# Patient Record
Sex: FEMALE | Race: BLACK OR AFRICAN AMERICAN
[De-identification: names, ages, dates, MRNs, and addresses within clinical notes are randomized per-mention and may not be internally consistent; named-entity substitution may affect disease eponyms.]

---

## 2017-11-05 ENCOUNTER — HOSPITAL ENCOUNTER (EMERGENCY)
Dept: HOSPITAL 17 - NEPE | Age: 67
Discharge: HOME | End: 2017-11-05
Payer: COMMERCIAL

## 2017-11-05 VITALS
SYSTOLIC BLOOD PRESSURE: 113 MMHG | HEART RATE: 82 BPM | DIASTOLIC BLOOD PRESSURE: 62 MMHG | RESPIRATION RATE: 18 BRPM | OXYGEN SATURATION: 96 %

## 2017-11-05 VITALS
OXYGEN SATURATION: 97 % | TEMPERATURE: 98.4 F | RESPIRATION RATE: 18 BRPM | HEART RATE: 97 BPM | SYSTOLIC BLOOD PRESSURE: 123 MMHG | DIASTOLIC BLOOD PRESSURE: 69 MMHG

## 2017-11-05 VITALS — DIASTOLIC BLOOD PRESSURE: 68 MMHG | SYSTOLIC BLOOD PRESSURE: 111 MMHG

## 2017-11-05 VITALS — DIASTOLIC BLOOD PRESSURE: 62 MMHG | SYSTOLIC BLOOD PRESSURE: 111 MMHG

## 2017-11-05 VITALS — HEIGHT: 61 IN | BODY MASS INDEX: 41.21 KG/M2 | WEIGHT: 218.26 LBS

## 2017-11-05 DIAGNOSIS — K85.90: ICD-10-CM

## 2017-11-05 DIAGNOSIS — K21.9: ICD-10-CM

## 2017-11-05 DIAGNOSIS — E11.9: ICD-10-CM

## 2017-11-05 DIAGNOSIS — R07.9: ICD-10-CM

## 2017-11-05 DIAGNOSIS — I50.9: ICD-10-CM

## 2017-11-05 DIAGNOSIS — I10: ICD-10-CM

## 2017-11-05 DIAGNOSIS — R60.9: Primary | ICD-10-CM

## 2017-11-05 DIAGNOSIS — D86.9: ICD-10-CM

## 2017-11-05 DIAGNOSIS — Z87.442: ICD-10-CM

## 2017-11-05 LAB
ANION GAP SERPL CALC-SCNC: 9 MEQ/L (ref 5–15)
BASOPHILS # BLD AUTO: 0 TH/MM3 (ref 0–0.2)
BASOPHILS NFR BLD: 0.8 % (ref 0–2)
BUN SERPL-MCNC: 43 MG/DL (ref 7–18)
CHLORIDE SERPL-SCNC: 105 MEQ/L (ref 98–107)
EOSINOPHIL # BLD: 0.1 TH/MM3 (ref 0–0.4)
EOSINOPHIL NFR BLD: 1.9 % (ref 0–4)
ERYTHROCYTE [DISTWIDTH] IN BLOOD BY AUTOMATED COUNT: 15.4 % (ref 11.6–17.2)
GFR SERPLBLD BASED ON 1.73 SQ M-ARVRAT: 30 ML/MIN (ref 89–?)
HCO3 BLD-SCNC: 27 MEQ/L (ref 21–32)
HCT VFR BLD CALC: 37 % (ref 35–46)
HEMO FLAGS: (no result)
INR PPP: 0.9 RATIO
LYMPHOCYTES # BLD AUTO: 1.9 TH/MM3 (ref 1–4.8)
LYMPHOCYTES NFR BLD AUTO: 29.7 % (ref 9–44)
MAGNESIUM SERPL-MCNC: 2.5 MG/DL (ref 1.5–2.5)
MCH RBC QN AUTO: 33.8 PG (ref 27–34)
MCHC RBC AUTO-ENTMCNC: 33.8 % (ref 32–36)
MCV RBC AUTO: 100.1 FL (ref 80–100)
MONOCYTES NFR BLD: 10.6 % (ref 0–8)
NEUTROPHILS # BLD AUTO: 3.6 TH/MM3 (ref 1.8–7.7)
NEUTROPHILS NFR BLD AUTO: 57 % (ref 16–70)
PLATELET # BLD: 216 TH/MM3 (ref 150–450)
POTASSIUM SERPL-SCNC: 4 MEQ/L (ref 3.5–5.1)
PROTHROMBIN TIME: 10 SEC (ref 9.8–11.6)
RBC # BLD AUTO: 3.7 MIL/MM3 (ref 4–5.3)
SODIUM SERPL-SCNC: 141 MEQ/L (ref 136–145)
WBC # BLD AUTO: 6.2 TH/MM3 (ref 4–11)

## 2017-11-05 PROCEDURE — 83735 ASSAY OF MAGNESIUM: CPT

## 2017-11-05 PROCEDURE — 93005 ELECTROCARDIOGRAM TRACING: CPT

## 2017-11-05 PROCEDURE — 84484 ASSAY OF TROPONIN QUANT: CPT

## 2017-11-05 PROCEDURE — 71010: CPT

## 2017-11-05 PROCEDURE — 85610 PROTHROMBIN TIME: CPT

## 2017-11-05 PROCEDURE — 99285 EMERGENCY DEPT VISIT HI MDM: CPT

## 2017-11-05 PROCEDURE — 83880 ASSAY OF NATRIURETIC PEPTIDE: CPT

## 2017-11-05 PROCEDURE — 80307 DRUG TEST PRSMV CHEM ANLYZR: CPT

## 2017-11-05 PROCEDURE — 80048 BASIC METABOLIC PNL TOTAL CA: CPT

## 2017-11-05 PROCEDURE — 85025 COMPLETE CBC W/AUTO DIFF WBC: CPT

## 2017-11-05 NOTE — RADRPT
EXAM DATE/TIME:  11/05/2017 17:40 

 

[facility] COMPARISON:     

No previous studies available for comparison.

 

                     

INDICATIONS :     

Chest Pain

                     

 

MEDICAL HISTORY :     

Congestive heart failure.          

 

SURGICAL HISTORY :     

None.   

 

ENCOUNTER:     

Initial                                        

 

ACUITY:     

1 day      

 

PAIN SCORE:     

3/10

 

LOCATION:     

Bilateral chest 

 

FINDINGS:     

A single view of the chest demonstrates the lungs to be symmetrically aerated without evidence of mas
s, infiltrate or effusion.  The cardiomediastinal contours are unremarkable.  Osseous structures are 
intact.

 

CONCLUSION:     

No acute cardiopulmonary disease demonstrated.

 

 

 

 Archie Christina MD on November 05, 2017 at 17:55           

Board Certified Radiologist.

 This report was verified electronically.

## 2017-11-05 NOTE — PD
HPI


Chief Complaint:  Edema


Time Seen by Provider:  16:23


Travel History


International Travel<30 days:  No


Contact w/Intl Traveler<30days:  No


Traveled to known affect area:  No





History of Present Illness


HPI


67-year-old female came to the emergency room with history of bilateral pedal 

edema that has started 3 days ago.  Patient wears an and clipped and had to 

take that off since the ankle was getting swollen and the jewelry was getting 

tight.  Patient has history of heart disease and on torsemide.  She says she 

has been compliant with her medications.  No history of chest pain.  She has 

some shortness of breath.  Vital signs were otherwise stable.  No history of 

long distance travel.  No history of previous DVT or PE.





PFSH


Past Medical History


*** Narrative Medical


List of her past medical, surgical, social and family history is reviewed from 

the nursing note.


Arthritis:  Yes (HIP, HANDS & SPINE, KNEES )


Asthma:  No


Autoimmune Disease:  Yes (SARCODOSIS)


Anxiety:  No


Depression:  Yes


Heart Rhythm Problems:  Yes


Cancer:  Yes (CERVICAL)


Cardiovascular Problems:  Yes


High Cholesterol:  No


Chemotherapy:  No


Chest Pain:  No


Congestive Heart Failure:  Yes


COPD:  No


Cerebrovascular Accident:  No


Diabetes:  Yes


Diminished Hearing:  No


Endocrine:  Yes


Gastrointestinal Disorders:  Yes (PANCREATITIS)


GERD:  Yes


Genitourinary:  Yes


Headaches:  Yes


Hepatitis:  Yes


Hypertension:  Yes


Immune Disorder:  No


Implanted Vascular Access Dvce:  No


Kidney Stones:  Yes


Musculoskeletal:  Yes


Neurologic:  No


Psychiatric:  Yes


Reproductive:  Yes (HYSTERERCTOMY 1973)


Respiratory:  Yes


Immunizations Current:  Yes


Migraines:  Yes


Radiation Therapy:  No


Renal Failure:  No


Sickle Cell Disease:  No


Sleep Apnea:  No


Thyroid Disease:  No


Ulcer:  Yes


Menopausal:  Yes


Tubal Ligation:  Yes





Past Surgical History


Abdominal Surgery:  Yes (APPY; CHOLECY )


AICD:  No


Appendectomy:  Yes (AGE 17)


Arteriovenous Shunt:  No


Cardiac Surgery:  No


Cholecystectomy:  Yes (1989)


Ear Surgery:  No


Eye Surgery:  No


Genitourinary Surgery:  No


Gynecologic Surgery:  Yes (HYSTERECTOMY (PARTIAL) )


Hysterectomy:  Yes


Insulin Pump:  No


Joint Replacement:  Yes (bilateral hips)


Oral Surgery:  No


Pacemaker:  No


Thoracic Surgery:  No


Tonsillectomy:  Yes (AGE 21)


Other Surgery:  Yes (BILATERAL BENIGN CYSTS REMOVED BREASTS)





Social History


Alcohol Use:  No (NONE SINCE 2004)


Tobacco Use:  Yes (1/2 PPD/STARTED AGE 23)


Substance Use:  No





Allergies-Medications


(Allergen,Severity, Reaction):  


Coded Allergies:  


     Sulfa (Sulfonamide Antibiotics) (Unverified  Allergy, Severe, SWELLING, 11/ 5/17)


 CAN'T BREATHE


     aspirin (Unverified  Allergy, Severe, "FAST HEART BEAT", 11/5/17)


 RASH, KIDNEY PROBLEMS


     codeine (Unverified  Allergy, Severe, SWELLING, 11/5/17)


 CAN'T BREATHE


     penicillin G (Unverified  Allergy, Severe, SWELLING, 11/5/17)


 CAN'T BREATHE


Comments


List of her allergies reviewed from the nursing note.


Reported Meds & Prescriptions





Reported Meds & Active Scripts


Active


Proventil Ud 0.083% (2.5 Mg/3 Ml) (Albuterol Sulfate) 2.5 Mg/3 Ml Inha 2.5 Mg 

INH Q4 PRN


Ventolin Hfa (Albuterol Sulfate) 8 Gm Aero 2 Puff INH Q6 PRN


     * SHAKE WELL BEFORE USE *


Deltasone 20 Mg Tab (Prednisone) 20 Mg Tab 40 Mg PO DAILY


Zithromax Z-Timur (Azithromycin) 250 Mg Tab 250 Mg PO AS DIRECTED 5 Days


     500 MG (2 TABLETS) PO ON DAY 1, THEN 250 MG (1 TABLET) PO ON DAYS 2 TO


     5.


Reported


Vitamin D (Cholecalciferol) 2,000 Unit Cap 1 Cap PO DAILY


Hm Omeprazole (Omeprazole) 20 Mg Tab 20 Mg PO DAILY


Zetia (Ezetimibe) 10 Mg Tab 10 Mg PO DAILY


Bethanechol Chloride 25 Mg Tab 25 Mg PO TID


Atenolol 50 Mg Tab 50 Mg PO DAILY


Allopurinol 300 Mg Tab 150 Mg PO DAILY


Nifediac cc (Nifedipine) 90 Mg Tab 90 Mg PO DAILY


Glipizide 5 Mg Tab 5 Mg PO BIDAC


Torsemide 20 Mg Tab 20 Mg PO BID


Folate (Folic Acid) 1 Mg Tab 1 Mg PO DAILY





Narrative Medication


List of her home medications reviewed from the nursing note.





Review of Systems


Except as stated in HPI:  all other systems reviewed are Neg


Respiratory:  Positive: Shortness of Breath





Physical Exam


Narrative


GENERAL: Awake, alert, obese, no obvious distress


SKIN: Focused skin assessment warm/dry.


HEAD: Atraumatic. Normocephalic. 


EYES: Pupils equal and round. No scleral icterus. No injection or drainage. 


ENT: No nasal bleeding or discharge.  Mucous membranes pink and moist.


NECK: Trachea midline. No JVD. 


CARDIOVASCULAR: Regular rate and rhythm.  No murmur appreciated.


RESPIRATORY: No accessory muscle use. Clear to auscultation. Breath sounds 

equal bilaterally. 


GASTROINTESTINAL: Abdomen soft, non-tender, nondistended. Hepatic and splenic 

margins not palpable. 


MUSCULOSKELETAL: No obvious deformities. No clubbing.  No cyanosis.  Bilateral 

pedal edema


NEUROLOGICAL: Awake and alert. No obvious cranial nerve deficits.  Motor 

grossly within normal limits. Normal speech.


PSYCHIATRIC: Appropriate mood and affect; insight and judgment normal.





Data


Data


Last Documented VS





Vital Signs








  Date Time  Temp Pulse Resp B/P (MAP) Pulse Ox O2 Delivery O2 Flow Rate FiO2


 


11/5/17 18:58    111/68 (82)    


 


11/5/17 17:39  82 18  96   


 


11/5/17 17:39      Room Air  


 


11/5/17 16:16 98.4       








Orders





 Orders


Electrocardiogram (11/5/17 16:35)


Basic Metabolic Panel (Bmp) (11/5/17 16:35)


B-Type Natriuretic Peptide (11/5/17 16:35)


Complete Blood Count With Diff (11/5/17 16:35)


Magnesium (Mg) (11/5/17 16:35)


Prothrombin Time / Inr (Pt) (11/5/17 16:35)


Troponin I (11/5/17 16:35)


Chest, Single Ap (11/5/17 16:35)


Ecg Monitoring (11/5/17 16:35)


Bilateral Bp Monitoring (11/5/17 16:35)


Iv Access Insert/Monitor (11/5/17 16:35)


Oximetry (11/5/17 16:35)


Oxygen Administration (11/5/17 16:35)


Sodium Chloride 0.9% Flush (Ns Flush) (11/5/17 16:45)


Drug Screen, Random Urine (11/5/17 16:35)


Vascular Access Team Consult/P PRN (11/5/17 16:51)


Vascular Poc Ultrasound (11/5/17 )


Ed Discharge Order (11/5/17 18:45)





Labs





Laboratory Tests








Test


  11/5/17


17:00 11/5/17


17:15


 


Urine Opiates Screen POS  


 


Urine Barbiturates Screen NEG  


 


Urine Amphetamines Screen NEG  


 


Urine Benzodiazepines Screen NEG  


 


Urine Cocaine Screen NEG  


 


Urine Cannabinoids Screen NEG  


 


White Blood Count  6.2 TH/MM3 


 


Red Blood Count  3.70 MIL/MM3 


 


Hemoglobin  12.5 GM/DL 


 


Hematocrit  37.0 % 


 


Mean Corpuscular Volume  100.1 FL 


 


Mean Corpuscular Hemoglobin  33.8 PG 


 


Mean Corpuscular Hemoglobin


Concent 


  33.8 % 


 


 


Red Cell Distribution Width  15.4 % 


 


Platelet Count  216 TH/MM3 


 


Mean Platelet Volume  9.3 FL 


 


Neutrophils (%) (Auto)  57.0 % 


 


Lymphocytes (%) (Auto)  29.7 % 


 


Monocytes (%) (Auto)  10.6 % 


 


Eosinophils (%) (Auto)  1.9 % 


 


Basophils (%) (Auto)  0.8 % 


 


Neutrophils # (Auto)  3.6 TH/MM3 


 


Lymphocytes # (Auto)  1.9 TH/MM3 


 


Monocytes # (Auto)  0.7 TH/MM3 


 


Eosinophils # (Auto)  0.1 TH/MM3 


 


Basophils # (Auto)  0.0 TH/MM3 


 


CBC Comment  DIFF FINAL 


 


Differential Comment   


 


Prothrombin Time  10.0 SEC 


 


Prothromb Time International


Ratio 


  0.9 RATIO 


 


 


Blood Urea Nitrogen  43 MG/DL 


 


Creatinine  2.00 MG/DL 


 


Random Glucose  87 MG/DL 


 


Calcium Level  8.7 MG/DL 


 


Magnesium Level  2.5 MG/DL 


 


Sodium Level  141 MEQ/L 


 


Potassium Level  4.0 MEQ/L 


 


Chloride Level  105 MEQ/L 


 


Carbon Dioxide Level  27.0 MEQ/L 


 


Anion Gap  9 MEQ/L 


 


Estimat Glomerular Filtration


Rate 


  30 ML/MIN 


 


 


Troponin I


  


  LESS THAN 0.02


NG/ML


 


B-Type Natriuretic Peptide  104 PG/ML 











MDM


Medical Decision Making


Medical Screen Exam Complete:  Yes


Emergency Medical Condition:  Yes


Medical Record Reviewed:  Yes


Interpretation(s)


Twelve-lead EKG was reviewed by me.  Normal sinus rhythm, left axis deviation, 

anterior lateral T wave inversions.  Heart rate of 92 bpm.


Differential Diagnosis


Fluid Overload, dependent edema


Narrative Course


5:18 PM awaiting for the blood test results.





6:46 PM blood test results are back.  Renal function has worsened from the 

past.  But patient is also on torsemide.  I'm comfortable discharging her home 

at this point.  She needs to follow up with her primary care nephrologist.





Procedures


EKG Prior to Arrival:  No





Diagnosis





 Primary Impression:  


 Edema


 Qualified Codes:  R60.9 - Edema, unspecified


 Additional Impression:  


 Renal insufficiency


Referrals:  


Primary Care Physician





***Additional Instructions:  


Please ask your primary care to refer you to a kidney doctor/nephrologist.  You 

should be on complete salt restricted diet.  Return to the ER if the condition 

worsens or any other new concerns.  Otherwise follow-up with your primary care 

in 1-2 days.


Disposition:  01 DISCHARGE HOME


Condition:  Stable











Jose Gonzalez MD Nov 5, 2017 16:24

## 2017-11-05 NOTE — EKG
Date Performed: 11/05/2017       Time Performed: 17:03:05

 

PTAGE:      67 years

 

EKG:      Sinus rhythm 

 

 NONSPECIFIC T-WAVE ABNORMALITY BORDERLINE ECG

 

PREVIOUS TRACING       : 10/22/2014 22.42 No significant change from previous tracing noted.

 

DOCTOR:   Nabor Ferro  Interpretating Date/Time  11/05/2017 21:09:04

## 2018-02-09 ENCOUNTER — HOSPITAL ENCOUNTER (EMERGENCY)
Dept: HOSPITAL 17 - NEPE | Age: 68
Discharge: HOME | End: 2018-02-09
Payer: COMMERCIAL

## 2018-02-09 VITALS
SYSTOLIC BLOOD PRESSURE: 152 MMHG | HEART RATE: 94 BPM | TEMPERATURE: 97.8 F | RESPIRATION RATE: 20 BRPM | DIASTOLIC BLOOD PRESSURE: 73 MMHG | OXYGEN SATURATION: 95 %

## 2018-02-09 VITALS
SYSTOLIC BLOOD PRESSURE: 131 MMHG | HEART RATE: 79 BPM | DIASTOLIC BLOOD PRESSURE: 70 MMHG | RESPIRATION RATE: 17 BRPM | OXYGEN SATURATION: 97 %

## 2018-02-09 VITALS — BODY MASS INDEX: 37.88 KG/M2 | HEIGHT: 61 IN | WEIGHT: 200.62 LBS

## 2018-02-09 DIAGNOSIS — R94.31: ICD-10-CM

## 2018-02-09 DIAGNOSIS — F17.200: ICD-10-CM

## 2018-02-09 DIAGNOSIS — I13.0: ICD-10-CM

## 2018-02-09 DIAGNOSIS — I50.9: ICD-10-CM

## 2018-02-09 DIAGNOSIS — N18.9: ICD-10-CM

## 2018-02-09 DIAGNOSIS — E11.22: ICD-10-CM

## 2018-02-09 DIAGNOSIS — K21.9: ICD-10-CM

## 2018-02-09 DIAGNOSIS — F32.9: ICD-10-CM

## 2018-02-09 DIAGNOSIS — E87.6: Primary | ICD-10-CM

## 2018-02-09 LAB
BASOPHILS # BLD AUTO: 0 TH/MM3 (ref 0–0.2)
BASOPHILS NFR BLD: 0.7 % (ref 0–2)
BUN SERPL-MCNC: 36 MG/DL (ref 7–18)
CALCIUM SERPL-MCNC: 8.8 MG/DL (ref 8.5–10.1)
CHLORIDE SERPL-SCNC: 106 MEQ/L (ref 98–107)
CREAT SERPL-MCNC: 1.46 MG/DL (ref 0.5–1)
EOSINOPHIL # BLD: 0.1 TH/MM3 (ref 0–0.4)
EOSINOPHIL NFR BLD: 1.9 % (ref 0–4)
ERYTHROCYTE [DISTWIDTH] IN BLOOD BY AUTOMATED COUNT: 15.5 % (ref 11.6–17.2)
GFR SERPLBLD BASED ON 1.73 SQ M-ARVRAT: 43 ML/MIN (ref 89–?)
GLUCOSE SERPL-MCNC: 83 MG/DL (ref 74–106)
HCO3 BLD-SCNC: 26 MEQ/L (ref 21–32)
HCT VFR BLD CALC: 39.2 % (ref 35–46)
HGB BLD-MCNC: 13.5 GM/DL (ref 11.6–15.3)
LYMPHOCYTES # BLD AUTO: 1.9 TH/MM3 (ref 1–4.8)
LYMPHOCYTES NFR BLD AUTO: 28.3 % (ref 9–44)
MAGNESIUM SERPL-MCNC: 2 MG/DL (ref 1.5–2.5)
MCH RBC QN AUTO: 34.1 PG (ref 27–34)
MCHC RBC AUTO-ENTMCNC: 34.5 % (ref 32–36)
MCV RBC AUTO: 98.9 FL (ref 80–100)
MONOCYTE #: 0.6 TH/MM3 (ref 0–0.9)
MONOCYTES NFR BLD: 8.3 % (ref 0–8)
NEUTROPHILS # BLD AUTO: 4.2 TH/MM3 (ref 1.8–7.7)
NEUTROPHILS NFR BLD AUTO: 60.8 % (ref 16–70)
PLATELET # BLD: 220 TH/MM3 (ref 150–450)
PMV BLD AUTO: 8.9 FL (ref 7–11)
RBC # BLD AUTO: 3.97 MIL/MM3 (ref 4–5.3)
SODIUM SERPL-SCNC: 139 MEQ/L (ref 136–145)
WBC # BLD AUTO: 6.9 TH/MM3 (ref 4–11)

## 2018-02-09 PROCEDURE — 80048 BASIC METABOLIC PNL TOTAL CA: CPT

## 2018-02-09 PROCEDURE — 85025 COMPLETE CBC W/AUTO DIFF WBC: CPT

## 2018-02-09 PROCEDURE — 99284 EMERGENCY DEPT VISIT MOD MDM: CPT

## 2018-02-09 PROCEDURE — 83735 ASSAY OF MAGNESIUM: CPT

## 2018-02-09 PROCEDURE — 93005 ELECTROCARDIOGRAM TRACING: CPT

## 2018-02-09 NOTE — PD
HPI


Chief Complaint:  Abnormal Results


Time Seen by Provider:  11:11


Travel History


International Travel<30 days:  No


Contact w/Intl Traveler<30days:  No


Traveled to known affect area:  No





History of Present Illness


HPI


67-year-old female patient with history of diabetes, hypertension, chronic 

kidney disease being followed by Dr. Jasso, had routine blood work done a few 

days ago and they had called her today stating that her potassium was elevated 

and that she needs to be evaluated in the ER.  Patient states that she has been 

having several days of feeling sluggish, headaches, and nausea.  She denies any 

vomiting, fevers, chest pains, shortness of breath, or other symptoms.





Modifying Factors: None


Associated Signs & Symptoms: Abnormal potassium on lab work


Risk Factors: Chronic kidney disease





PFSH


Past Medical History


Arthritis:  Yes (HIP, HANDS & SPINE, KNEES )


Asthma:  No


Autoimmune Disease:  Yes (SARCODOSIS)


Anxiety:  No


Depression:  Yes


Heart Rhythm Problems:  Yes


Cancer:  Yes (CERVICAL)


Cardiovascular Problems:  Yes


High Cholesterol:  No


Chemotherapy:  No


Chest Pain:  No


Congestive Heart Failure:  Yes


COPD:  No


Cerebrovascular Accident:  No


Diabetes:  Yes


Patient Takes Glucophage:  No


Diminished Hearing:  No


Endocrine:  Yes


Gastrointestinal Disorders:  Yes (PANCREATITIS)


GERD:  Yes


Genitourinary:  Yes


Headaches:  Yes


Hepatitis:  Yes


Hypertension:  Yes


Immune Disorder:  No


Implanted Vascular Access Dvce:  No


Kidney Stones:  Yes


Musculoskeletal:  Yes


Neurologic:  No


Psychiatric:  Yes


Reproductive:  Yes (HYSTERERCTOMY 1973)


Respiratory:  Yes


Immunizations Current:  Yes


Migraines:  Yes


Radiation Therapy:  No


Renal Failure:  No


Sickle Cell Disease:  No


Sleep Apnea:  No


Thyroid Disease:  No


Ulcer:  Yes


Tetanus Vaccination:  > 5 Years


Pregnant?:  Not Pregnant


Menopausal:  Yes


Tubal Ligation:  Yes





Past Surgical History


Abdominal Surgery:  Yes (APPY; CHOLECY )


AICD:  No


Appendectomy:  Yes (AGE 17)


Arteriovenous Shunt:  No


Cardiac Surgery:  No


Cholecystectomy:  Yes (1989)


Ear Surgery:  No


Eye Surgery:  No


Genitourinary Surgery:  No


Gynecologic Surgery:  Yes (HYSTERECTOMY (PARTIAL) )


Hysterectomy:  Yes


Insulin Pump:  No


Joint Replacement:  Yes (bilateral hips)


Oral Surgery:  No


Pacemaker:  No


Thoracic Surgery:  No


Tonsillectomy:  Yes (AGE 21)


Other Surgery:  Yes (BILATERAL BENIGN CYSTS REMOVED BREASTS)





Social History


Alcohol Use:  No (NONE SINCE 2004)


Tobacco Use:  Yes (1/2 PPD/STARTED AGE 23)


Substance Use:  No





Allergies-Medications


(Allergen,Severity, Reaction):  


Coded Allergies:  


     Sulfa (Sulfonamide Antibiotics) (Unverified  Allergy, Severe, SWELLING, 2/9 /18)


 CAN'T BREATHE


     aspirin (Unverified  Allergy, Severe, "FAST HEART BEAT", 2/9/18)


 RASH, KIDNEY PROBLEMS


     codeine (Unverified  Allergy, Severe, SWELLING, 2/9/18)


 CAN'T BREATHE


     penicillin G (Unverified  Allergy, Severe, SWELLING, 2/9/18)


 CAN'T BREATHE


Reported Meds & Prescriptions





Reported Meds & Active Scripts


Active


Reported


Nifedipine ER 24 HR (Nifedipine) 90 Mg Tab 90 Mg PO DAILY


Losartan (Losartan Potassium) 25 Mg Tab 25 Mg PO DAILY


Sodium Bicarbonate 650 Mg Tab 650 Mg PO BIDPC


Biotin 5 Mg Cap 5 Mg PO 


Bumetanide 2 Mg Tab 2 Mg PO BID


Bethanechol 25 Mg Tab 25 Mg PO Q8HR


Atenolol 50 Mg Tab 50 Mg PO DAILY


Potassium Chloride ER (Potassium Chloride) 10 Meq Cap 10 Meq PO BID


Allopurinol 300 Mg Tab 300 Mg PO DAILY


Duloxetine DR (Duloxetine HCl) 30 Mg Capdr 30 Mg PO DAILY


Omeprazole 20 Mg Tab 20 Mg PO DAILY


Folic Acid 0.8 Mg Tab 1,000 Mcg PO DAILY


Glipizide 5 Mg Tab 5 Mg PO BIDAC


     Take 30 minutes before a meal


Gabapentin 300 Mg Cap 300 Mg PO TID


Vitamin D-1000 (Cholecalciferol) 1,000 Unit Tab 2,000 Units PO DAILY








Review of Systems


Except as stated in HPI:  all other systems reviewed are Neg





Physical Exam


Narrative


GENERAL: Well-developed elderly -American female patient currently in no 

acute distress.  Awake and oriented 3.


SKIN: Focused skin assessment warm/dry.


HEAD: Atraumatic. Normocephalic. 


EYES: Pupils equal and round. No scleral icterus. No injection or drainage. 


ENT: No nasal bleeding or discharge.  Mucous membranes pink and moist.


NECK: Trachea midline. No JVD. 


CARDIOVASCULAR: Regular rate and rhythm.  No murmur appreciated.


RESPIRATORY: No accessory muscle use. Clear to auscultation. Breath sounds 

equal bilaterally. 


GASTROINTESTINAL: Abdomen soft, non-tender, nondistended. Hepatic and splenic 

margins not palpable. 


MUSCULOSKELETAL: No obvious deformities. No clubbing.  No cyanosis.  No edema. 


NEUROLOGICAL: Awake and alert. No obvious cranial nerve deficits.  Motor 

grossly within normal limits. Normal speech.


PSYCHIATRIC: Appropriate mood and affect; insight and judgment normal.





Data


Data


Last Documented VS





Vital Signs








  Date Time  Temp Pulse Resp B/P (MAP) Pulse Ox O2 Delivery O2 Flow Rate FiO2


 


2/9/18 10:17 97.8 94 20 152/73 (99) 95   








Orders





 Orders


Electrocardiogram (2/9/18 10:48)


Complete Blood Count With Diff (2/9/18 10:48)


Basic Metabolic Panel (Bmp) (2/9/18 10:48)


Magnesium (Mg) (2/9/18 10:48)


Ed Discharge Order (2/9/18 12:39)





Labs





Laboratory Tests








Test


  2/9/18


11:30


 


White Blood Count 6.9 TH/MM3 


 


Red Blood Count 3.97 MIL/MM3 


 


Hemoglobin 13.5 GM/DL 


 


Hematocrit 39.2 % 


 


Mean Corpuscular Volume 98.9 FL 


 


Mean Corpuscular Hemoglobin 34.1 PG 


 


Mean Corpuscular Hemoglobin


Concent 34.5 % 


 


 


Red Cell Distribution Width 15.5 % 


 


Platelet Count 220 TH/MM3 


 


Mean Platelet Volume 8.9 FL 


 


Neutrophils (%) (Auto) 60.8 % 


 


Lymphocytes (%) (Auto) 28.3 % 


 


Monocytes (%) (Auto) 8.3 % 


 


Eosinophils (%) (Auto) 1.9 % 


 


Basophils (%) (Auto) 0.7 % 


 


Neutrophils # (Auto) 4.2 TH/MM3 


 


Lymphocytes # (Auto) 1.9 TH/MM3 


 


Monocytes # (Auto) 0.6 TH/MM3 


 


Eosinophils # (Auto) 0.1 TH/MM3 


 


Basophils # (Auto) 0.0 TH/MM3 


 


CBC Comment DIFF FINAL 


 


Differential Comment  


 


Blood Urea Nitrogen 36 MG/DL 


 


Creatinine 1.46 MG/DL 


 


Random Glucose 83 MG/DL 


 


Calcium Level 8.8 MG/DL 


 


Magnesium Level 2.0 MG/DL 


 


Sodium Level 139 MEQ/L 


 


Potassium Level 4.5 MEQ/L 


 


Chloride Level 106 MEQ/L 


 


Carbon Dioxide Level 26.0 MEQ/L 


 


Anion Gap 7 MEQ/L 


 


Estimat Glomerular Filtration


Rate 43 ML/MIN 


 











MDM


Medical Decision Making


Medical Screen Exam Complete:  Yes


Emergency Medical Condition:  Yes


Medical Record Reviewed:  Yes


Interpretation(s)


EKG shows NSR, no ST elevation or depression, and no arrhythmias.  No  

significant T-wave inversions.








Laboratory Tests








Test


  2/9/18


11:30


 


Red Blood Count


  3.97 MIL/MM3


(4.00-5.30)


 


Mean Corpuscular Hemoglobin


  34.1 PG


(27.0-34.0)


 


Monocytes (%) (Auto)


  8.3 %


(0.0-8.0)


 


Blood Urea Nitrogen


  36 MG/DL


(7-18)


 


Creatinine


  1.46 MG/DL


(0.50-1.00)


 


Estimat Glomerular Filtration


Rate 43 ML/MIN


(>89)








Differential Diagnosis


Abnormal potassium: Hemolyzed cyst versus chronic kidney disease versus 

metabolic issues


Narrative Course


Lab work shows a normal potassium in the ER.  At this point, vital signs are 

stable.  There are no signs of dysrhythmias.  My plan would be to release her 

with follow-up to renal doctor.  Return for any new issues as needed.  The plan 

has been discussed with her and she states understanding.





Diagnosis





 Primary Impression:  


 Abnormal laboratory test


Disposition:  01 DISCHARGE HOME


Condition:  Stable











Anya Newell MD Feb 9, 2018 11:32

## 2018-02-11 NOTE — EKG
Date Performed: 02/09/2018       Time Performed: 11:08:54

 

PTAGE:      67 years

 

EKG:      Sinus rhythm 

 

 MODERATE VOLTAGE CRITERIA FOR LVH, CONSIDER NORMAL VARIANT NONSPECIFIC T-WAVE ABNORMALITY BORDERLINE
 ECG

 

PREVIOUS TRACING       : 11/05/2017 17.03 Compared to prior tracing,  now with criteria for LVH

 

DOCTOR:   Octavio Manriquez  Interpretating Date/Time  02/11/2018 00:31:59

## 2018-03-04 ENCOUNTER — HOSPITAL ENCOUNTER (EMERGENCY)
Dept: HOSPITAL 17 - NEPD | Age: 68
Discharge: HOME | End: 2018-03-04
Payer: COMMERCIAL

## 2018-03-04 VITALS — BODY MASS INDEX: 40.79 KG/M2 | WEIGHT: 216.05 LBS | HEIGHT: 61 IN

## 2018-03-04 VITALS
OXYGEN SATURATION: 97 % | RESPIRATION RATE: 18 BRPM | DIASTOLIC BLOOD PRESSURE: 67 MMHG | SYSTOLIC BLOOD PRESSURE: 126 MMHG | TEMPERATURE: 98.6 F | HEART RATE: 86 BPM

## 2018-03-04 VITALS — SYSTOLIC BLOOD PRESSURE: 130 MMHG | DIASTOLIC BLOOD PRESSURE: 72 MMHG

## 2018-03-04 DIAGNOSIS — I11.0: ICD-10-CM

## 2018-03-04 DIAGNOSIS — F17.200: ICD-10-CM
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
## 2018-03-04 DIAGNOSIS — S76.011A: Primary | ICD-10-CM

## 2018-03-04 DIAGNOSIS — I50.9: ICD-10-CM

## 2018-03-04 PROCEDURE — 99283 EMERGENCY DEPT VISIT LOW MDM: CPT

## 2018-03-04 PROCEDURE — 72110 X-RAY EXAM L-2 SPINE 4/>VWS: CPT

## 2018-03-04 PROCEDURE — 73502 X-RAY EXAM HIP UNI 2-3 VIEWS: CPT

## 2018-03-04 PROCEDURE — 73552 X-RAY EXAM OF FEMUR 2/>: CPT

## 2018-03-04 NOTE — PD
HPI


Chief Complaint:  Hip Injury


Time Seen by Provider:  13:58


Travel History


International Travel<30 days:  No


Contact w/Intl Traveler<30days:  No


Traveled to known affect area:  No





History of Present Illness


HPI


68-year-old female came to the emergency room with history of a fall 2 days 

ago.  Patient says since then her right hip has been hurting.  She is able to 

walk but it hurts and the pain is progressively getting worse.  She has pain 

pills at home and she has taken them but does not seem to help.  Finally the 

patient decided to come to the emergency room.  She has had hip replacement 

surgery in the past.  She's never had issues with this happened in the past.  

Patient says the fall was due to tripping on her dog.  Vital signs are stable.





Critical access hospital


Past Medical History


*** Narrative Medical


List of her past medical, surgical, social and family history is reviewed from 

the nursing note.


Arthritis:  Yes (HIP, HANDS & SPINE, KNEES )


Asthma:  No


Autoimmune Disease:  Yes (SARCODOSIS)


Anxiety:  No


Depression:  Yes


Heart Rhythm Problems:  Yes


Cancer:  Yes (CERVICAL)


Cardiovascular Problems:  Yes


High Cholesterol:  No


Chemotherapy:  No


Chest Pain:  No


Congestive Heart Failure:  Yes


COPD:  No


Cerebrovascular Accident:  No


Diabetes:  Yes (TYPE 2)


Diminished Hearing:  No


Endocrine:  Yes


Gastrointestinal Disorders:  Yes (PANCREATITIS)


GERD:  Yes


Genitourinary:  Yes


Headaches:  Yes


Hepatitis:  Yes


Hypertension:  Yes


Immune Disorder:  No


Implanted Vascular Access Dvce:  No


Kidney Stones:  Yes


Musculoskeletal:  Yes


Neurologic:  No


Psychiatric:  Yes


Reproductive:  Yes (HYSTERERCTOMY 1973)


Respiratory:  Yes


Immunizations Current:  Yes


Migraines:  Yes


Radiation Therapy:  No


Renal Failure:  No


Sickle Cell Disease:  No


Sleep Apnea:  No


Thyroid Disease:  No


Ulcer:  Yes


Pregnant?:  Not Pregnant


Menopausal:  Yes


Tubal Ligation:  Yes





Past Surgical History


Abdominal Surgery:  Yes (APPY; CHOLECY )


AICD:  No


Appendectomy:  Yes (AGE 17)


Arteriovenous Shunt:  No


Cardiac Surgery:  No


Cholecystectomy:  Yes (1989)


Ear Surgery:  No


Eye Surgery:  No


Genitourinary Surgery:  No


Gynecologic Surgery:  Yes (HYSTERECTOMY (PARTIAL) )


Hysterectomy:  Yes


Insulin Pump:  No


Joint Replacement:  Yes (bilateral hips)


Oral Surgery:  No


Pacemaker:  No


Thoracic Surgery:  No


Tonsillectomy:  Yes (AGE 21)


Other Surgery:  Yes (BILATERAL BENIGN CYSTS REMOVED BREASTS)





Social History


Alcohol Use:  No (NONE SINCE 2004)


Tobacco Use:  Yes (1/2 PPD/STARTED AGE 23)


Substance Use:  No





Allergies-Medications


(Allergen,Severity, Reaction):  


Coded Allergies:  


     Sulfa (Sulfonamide Antibiotics) (Unverified  Allergy, Severe, SWELLING, 3/4

/18)


 CAN'T BREATHE


     aspirin (Unverified  Allergy, Severe, "FAST HEART BEAT", 3/4/18)


 RASH, KIDNEY PROBLEMS


     codeine (Unverified  Allergy, Severe, SWELLING, 3/4/18)


 CAN'T BREATHE


     penicillin G (Unverified  Allergy, Severe, SWELLING, 3/4/18)


 CAN'T BREATHE


Comments


List of her allergies reviewed from the nursing note.


Reported Meds & Prescriptions





Reported Meds & Active Scripts


Active


Flexeril (Cyclobenzaprine HCl) 5 Mg Tab 5 Mg PO TID


Reported


Nifedipine ER 24 HR (Nifedipine) 90 Mg Tab 90 Mg PO DAILY


Losartan (Losartan Potassium) 25 Mg Tab 25 Mg PO DAILY


Sodium Bicarbonate 650 Mg Tab 650 Mg PO BIDPC


Biotin 5 Mg Cap 5 Mg PO 


Bumetanide 2 Mg Tab 2 Mg PO BID


Bethanechol 25 Mg Tab 25 Mg PO Q8HR


Atenolol 50 Mg Tab 50 Mg PO DAILY


Potassium Chloride ER (Potassium Chloride) 10 Meq Cap 10 Meq PO BID


Allopurinol 300 Mg Tab 300 Mg PO DAILY


Duloxetine DR (Duloxetine HCl) 30 Mg Capdr 30 Mg PO DAILY


Omeprazole 20 Mg Tab 20 Mg PO DAILY


Folic Acid 0.8 Mg Tab 1,000 Mcg PO DAILY


Glipizide 5 Mg Tab 5 Mg PO BIDAC


     Take 30 minutes before a meal


Gabapentin 300 Mg Cap 300 Mg PO TID


Vitamin D-1000 (Cholecalciferol) 1,000 Unit Tab 2,000 Units PO DAILY





Narrative Medication


List of her home medications reviewed from the nursing note.





Review of Systems


Except as stated in HPI:  all other systems reviewed are Neg


Musculoskeletal:  Positive: Pain





Physical Exam


Narrative


GENERAL: Awake, alert, moderate distress, obese


SKIN: Focused skin assessment warm/dry.


HEAD: Atraumatic. Normocephalic. 


EYES: Pupils equal and round. No scleral icterus. No injection or drainage. 


ENT: No nasal bleeding or discharge.  Mucous membranes pink and moist.


NECK: Trachea midline. No JVD. 


CARDIOVASCULAR: Regular rate and rhythm.  No murmur appreciated.


RESPIRATORY: No accessory muscle use. Clear to auscultation. Breath sounds 

equal bilaterally. 


GASTROINTESTINAL: Abdomen soft, non-tender, nondistended. Hepatic and splenic 

margins not palpable. 


MUSCULOSKELETAL: No obvious deformities. No clubbing.  No cyanosis.  No edema.  

Slight antalgic gait on the right side


NEUROLOGICAL: Awake and alert. No obvious cranial nerve deficits.  Motor 

grossly within normal limits. Normal speech.


PSYCHIATRIC: Appropriate mood and affect; insight and judgment normal.





Data


Data


Last Documented VS





Vital Signs








  Date Time  Temp Pulse Resp B/P (MAP) Pulse Ox O2 Delivery O2 Flow Rate FiO2


 


3/4/18 15:29  78 18 130/72 (91) 98   


 


3/4/18 13:40 98.6       








Orders





 Orders


Hip, Uni(Ap&Lat) W Ap Pelvis (3/4/18 )


Femur (Ap & Lat/2vws) (3/4/18 )


Spine, Lumbar Comp W/Obliq (3/4/18 )


Ed Discharge Order (3/4/18 15:21)








MDM


Medical Decision Making


Medical Screen Exam Complete:  Yes


Emergency Medical Condition:  Yes


Medical Record Reviewed:  Yes


Differential Diagnosis


Hip fracture, hip strain, pelvic fracture


Narrative Course


2:47 PM awaiting for the x-rays to be done and resulted.  Patient has already 

taken pain medication at home before coming in and hence I did not give her 

anything for pain here.





3:21 PM all the x-rays came back reported no acute injuries.  I will discharge 

her home.  She'll go home with prescription for muscle relaxants.





Procedures


EKG Prior to Arrival:  No





Diagnosis





 Primary Impression:  


 Fall


 Qualified Codes:  W19.XXXA - Unspecified fall, initial encounter


 Additional Impression:  


 Hip strain


 Qualified Codes:  S76.011A - Strain of muscle, fascia and tendon of right hip, 

initial encounter


Referrals:  


Primary Care Physician





***Additional Instructions:  


Take the medication as per the prescription direction.  Follow-up with your 

primary care.


***Med/Other Pt SpecificInfo:  Prescription(s) given


Scripts


Cyclobenzaprine (Flexeril) 5 Mg Tab


5 MG PO TID for Muscle Spasm, #15 TAB 0 Refills


   Prov: Jose Gonzalez MD         3/4/18


Disposition:  01 DISCHARGE HOME


Condition:  Stable











Jose Gonzalez MD Mar 4, 2018 13:59

## 2018-03-04 NOTE — RADRPT
EXAM DATE/TIME:  03/04/2018 14:18 

 

[facility] COMPARISON:     

No previous studies available for comparison.

 

                     

INDICATIONS :     

Patient fell 3 days ago now pain in lower back.

                     

 

MEDICAL HISTORY :     

Congestive heart failure.          

 

SURGICAL HISTORY :     

None.   

 

ENCOUNTER:     

Initial                                        

 

ACUITY:     

3 days      

 

PAIN SCORE:     

8/10

 

LOCATION:     

Bilateral  lower back

 

FINDINGS:     

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 There no compression deformities. Mild disc space multi
level osteophyte formation present. Moderate facet hypertrophic changes are noted. Mild anterior oste
ophyte formation.

 

CONCLUSION:     

Degenerative changes are noted. No obvious fractures.

 

 

 

 Williams Valles MD on March 04, 2018 at 14:39           

Board Certified Radiologist.

 This report was verified electronically.

## 2018-03-04 NOTE — RADRPT
EXAM DATE/TIME:  03/04/2018 14:19 

 

[facility] COMPARISON:     

HIP RIGHT (AP&LAT 2/3VWS) W AP PELVIS, March 04, 2018, 14:19.

 

                     

INDICATIONS :     

Patient fell 3 days ago, pain in right leg and back.

                     

 

MEDICAL HISTORY :     

Congestive heart failure.          

 

SURGICAL HISTORY :        

bilateral total hips

 

ENCOUNTER:     

Initial                                        

 

ACUITY:     

3 days      

 

PAIN SCORE:     

8/10

 

LOCATION:     

Right  leg

 

FINDINGS:     

Right total hip arthroplasty. Femoral and acetabular components appear well-seated. No acute fracture
s are seen. There is some heterotopic bone adjacent to the trochanters.

 

CONCLUSION:     No acute disease.  

 

 

 

 Williams Valles MD on March 04, 2018 at 14:44           

Board Certified Radiologist.

 This report was verified electronically.

## 2018-03-04 NOTE — RADRPT
EXAM DATE/TIME:  03/04/2018 14:19 

 

[facility] COMPARISON:     

FEMUR RIGHT (AP & LAT/2VWS), March 04, 2018, 14:19.

 

                     

INDICATIONS :     

Patient fell 3 days ago, pain in back and right hip.

                     

 

MEDICAL HISTORY :     

Congestive heart failure.          

 

SURGICAL HISTORY :        

bilateral total hip  

 

ENCOUNTER:     

Initial                                        

 

ACUITY:     

3 days      

 

PAIN SCORE:     

7/10

 

LOCATION:     

Right  leg

 

FINDINGS:     

Right total hip arthroplasty. Some heterotopic bone about the trochanters is seen. No obvious acute f
ractures are present. Left total hip arthroplasty with heterotopic bone adjacent to the greater troch
anter.

 

CONCLUSION:     No acute disease.  

 

 

 

 Williams Valles MD on March 04, 2018 at 15:13           

Board Certified Radiologist.

 This report was verified electronically.

## 2018-03-31 ENCOUNTER — HOSPITAL ENCOUNTER (EMERGENCY)
Dept: HOSPITAL 17 - NEPC | Age: 68
Discharge: HOME | End: 2018-03-31
Payer: COMMERCIAL

## 2018-03-31 VITALS
OXYGEN SATURATION: 98 % | HEART RATE: 89 BPM | SYSTOLIC BLOOD PRESSURE: 167 MMHG | DIASTOLIC BLOOD PRESSURE: 76 MMHG | TEMPERATURE: 98.8 F | RESPIRATION RATE: 16 BRPM

## 2018-03-31 VITALS — WEIGHT: 217.16 LBS | BODY MASS INDEX: 41 KG/M2 | HEIGHT: 61 IN

## 2018-03-31 DIAGNOSIS — F32.9: ICD-10-CM

## 2018-03-31 DIAGNOSIS — D86.9: ICD-10-CM

## 2018-03-31 DIAGNOSIS — E11.9: ICD-10-CM

## 2018-03-31 DIAGNOSIS — M43.6: Primary | ICD-10-CM

## 2018-03-31 DIAGNOSIS — F17.200: ICD-10-CM

## 2018-03-31 DIAGNOSIS — K21.9: ICD-10-CM

## 2018-03-31 DIAGNOSIS — I50.9: ICD-10-CM

## 2018-03-31 DIAGNOSIS — I11.0: ICD-10-CM

## 2018-03-31 PROCEDURE — 99283 EMERGENCY DEPT VISIT LOW MDM: CPT

## 2018-03-31 PROCEDURE — 72040 X-RAY EXAM NECK SPINE 2-3 VW: CPT

## 2018-03-31 NOTE — RADRPT
EXAM DATE/TIME:  03/31/2018 01:25 

 

[facility] COMPARISON:     

No previous studies available for comparison.

 

                     

INDICATIONS :     

Neck pain.

                     

 

MEDICAL HISTORY :     

Diabetes mellitus type II.  Hypertension        

 

SURGICAL HISTORY :     

None.   

 

ENCOUNTER:     

Initial                                        

 

ACUITY:     

2 days      

 

PAIN SCORE:     

10/10

 

LOCATION:     

Right  C-spine.

 

FINDINGS:     

Two projection examination was performed.  There is normal alignment and curvature of the vertebral b
odies down to the level of C7.  No evidence of fracture or subluxation.  Vertebral body height is paul
ntained.  The disc spaces are maintained.  The prevertebral soft tissues are of normal thickness.  Th
e atlanto-axial articulation is intact.

 

CONCLUSION:     

Unremarkable limited examination of the cervical spine.  

 

 

 

 Arsen Potter MD on March 31, 2018 at 1:46           

Board Certified Radiologist.

 This report was verified electronically.

## 2018-03-31 NOTE — PD
HPI


Chief Complaint:  Back/ Neck Pain or Injury


Time Seen by Provider:  01:06


Travel History


International Travel<30 days:  No


Contact w/Intl Traveler<30days:  No


Traveled to known affect area:  No





History of Present Illness


HPI


68-year-old female complains of right-sided neck pain.  Patient states that the 

pain started 2 days ago.  Patient states that she woke up with the pain.  

Patient states that her neck pain is muscular spasm type of pain localized the 

right-sided neck.  Patient states that she has right-sided with the pain.  

Patient denies any visual change.  Patient denies any chest pain or shortness 

of breath.  Patient denies abdominal pain.  Patient denies any focal weakness 

or numbness of extremity.  Patient denies any fever chills.  Patient denies any 

recent injury.  On a scale of 1-10 the pain is an 8.





PFSH


Past Medical History


Arthritis:  Yes (HIP, HANDS & SPINE, KNEES )


Asthma:  No


Autoimmune Disease:  Yes (SARCODOSIS)


Anxiety:  No


Depression:  Yes


Heart Rhythm Problems:  Yes


Cancer:  Yes (CERVICAL)


Cardiovascular Problems:  Yes (HTN)


High Cholesterol:  No


Chemotherapy:  No


Chest Pain:  No


Congestive Heart Failure:  Yes


COPD:  No


Cerebrovascular Accident:  No


Diabetes:  Yes


Patient Takes Glucophage:  Yes (Glipizide)


Diminished Hearing:  No


Endocrine:  Yes


Gastrointestinal Disorders:  Yes (PANCREATITIS)


GERD:  Yes


Genitourinary:  Yes


Headaches:  Yes


Hepatitis:  Yes


Hypertension:  Yes


Immune Disorder:  No


Implanted Vascular Access Dvce:  No


Kidney Stones:  Yes


Musculoskeletal:  Yes


Neurologic:  No


Psychiatric:  Yes


Reproductive:  Yes (HYSTERERCTOMY 1973)


Respiratory:  Yes


Immunizations Current:  Yes


Migraines:  Yes


Radiation Therapy:  No


Renal Failure:  No


Sickle Cell Disease:  No


Sleep Apnea:  No


Thyroid Disease:  No


Ulcer:  Yes


Tetanus Vaccination:  > 5 Years


Influenza Vaccination:  Yes


Menopausal:  Yes


Tubal Ligation:  Yes





Past Surgical History


Abdominal Surgery:  Yes (APPY; CHOLECY )


AICD:  No


Appendectomy:  Yes (AGE 17)


Arteriovenous Shunt:  No


Cardiac Surgery:  No


Cholecystectomy:  Yes (1989)


Ear Surgery:  No


Eye Surgery:  No


Genitourinary Surgery:  No


Gynecologic Surgery:  Yes (HYSTERECTOMY (PARTIAL) )


Hysterectomy:  Yes


Insulin Pump:  No


Joint Replacement:  Yes (bilateral hips)


Neurologic Surgery:  No


Oral Surgery:  No


Pacemaker:  No


Thoracic Surgery:  No


Tonsillectomy:  Yes (AGE 21)


Other Surgery:  Yes (BILATERAL BENIGN CYSTS REMOVED BREASTS)





Social History


Alcohol Use:  No (NONE SINCE 2004)


Tobacco Use:  Yes (1/2 PPD/STARTED AGE 23)


Substance Use:  No





Allergies-Medications


(Allergen,Severity, Reaction):  


Coded Allergies:  


     Sulfa (Sulfonamide Antibiotics) (Unverified  Allergy, Severe, SWELLING, 3/4

/18)


 CAN'T BREATHE


     aspirin (Unverified  Allergy, Severe, "FAST HEART BEAT", 3/4/18)


 RASH, KIDNEY PROBLEMS


     codeine (Unverified  Allergy, Severe, SWELLING, 3/4/18)


 CAN'T BREATHE


     penicillin G (Unverified  Allergy, Severe, SWELLING, 3/4/18)


 CAN'T BREATHE


Reported Meds & Prescriptions





Reported Meds & Active Scripts


Active


Flexeril (Cyclobenzaprine HCl) 5 Mg Tab 5 Mg PO TID


Reported


Nifedipine ER 24 HR (Nifedipine) 90 Mg Tab 90 Mg PO DAILY


Losartan (Losartan Potassium) 25 Mg Tab 25 Mg PO DAILY


Sodium Bicarbonate 650 Mg Tab 650 Mg PO BIDPC


Biotin 5 Mg Cap 5 Mg PO 


Bumetanide 2 Mg Tab 2 Mg PO BID


Bethanechol 25 Mg Tab 25 Mg PO Q8HR


Atenolol 50 Mg Tab 50 Mg PO DAILY


Potassium Chloride ER (Potassium Chloride) 10 Meq Cap 10 Meq PO BID


Allopurinol 300 Mg Tab 300 Mg PO DAILY


Duloxetine DR (Duloxetine HCl) 30 Mg Capdr 30 Mg PO DAILY


Omeprazole 20 Mg Tab 20 Mg PO DAILY


Folic Acid 0.8 Mg Tab 1,000 Mcg PO DAILY


Glipizide 5 Mg Tab 5 Mg PO BIDAC


     Take 30 minutes before a meal


Gabapentin 300 Mg Cap 300 Mg PO TID


Vitamin D-1000 (Cholecalciferol) 1,000 Unit Tab 2,000 Units PO DAILY








Review of Systems


General / Constitutional:  No: Fever


Eyes:  No: Visual changes


HENT:  Positive: Neck Pain, No: Headaches


Cardiovascular:  No: Chest Pain or Discomfort


Respiratory:  No: Shortness of Breath


Gastrointestinal:  No: Abdominal Pain


Genitourinary:  No: Dysuria


Musculoskeletal:  No: Pain


Skin:  No Rash


Neurologic:  No: Weakness


Psychiatric:  No: Depression


Endocrine:  No: Polydipsia


Hematologic/Lymphatic:  No: Easy Bruising





Physical Exam


Narrative


GENERAL: Well-nourished, well-developed patient.


SKIN: Focused skin assessment warm/dry.


HEAD: Normocephalic.


EYES: No scleral icterus. No injection or drainage. 


NECK: Supple, trachea midline. No JVD or lymphadenopathy.  Patient has moderate 

tenderness on palpation right paraspinal area cervical spine.  No midline 

tenderness.


CARDIOVASCULAR: Regular rate and rhythm without murmurs, gallops, or rubs. 


RESPIRATORY: Breath sounds equal bilaterally. No accessory muscle use.


GASTROINTESTINAL: Abdomen soft, non-tender, nondistended. 


MUSCULOSKELETAL: No cyanosis, or edema. 


BACK: Nontender without obvious deformity. No CVA tenderness.


Neurologic exam normal.





Data


Data


Last Documented VS





Vital Signs








  Date Time  Temp Pulse Resp B/P (MAP) Pulse Ox O2 Delivery O2 Flow Rate FiO2


 


3/31/18 00:32 98.8 89 16 167/76 (106) 98   








Orders





 Orders


Spine, Cervical - Ltd (Ap&Lat) (3/31/18 01:10)








[facility]


Medical Decision Making


Medical Screen Exam Complete:  Yes


Emergency Medical Condition:  Yes


Interpretation(s)





Last Impressions








Cervical Spine X-Ray 3/31/18 0110 Signed





Impressions: 





 Service Date/Time:  Saturday, March 31, 2018 01:25 - CONCLUSION:  Unremarkable 





 limited examination of the cervical spine.       Arsen Potter MD 








Differential Diagnosis


Differential diagnosis including muscular spasm, fracture, HNP.


Narrative Course


68-year-old female with right-sided neck pain.  Nontraumatic.





Diagnosis





 Primary Impression:  


 Acute torticollis


Patient Instructions:  General Instructions





***Additional Instructions:  


Flexeril as needed for pain.  Moist heat to the neck.  Follow-up with personal 

physician.


***Med/Other Pt SpecificInfo:  Prescription(s) given


Scripts


Cyclobenzaprine (Flexeril) 10 Mg Tab


10 MG PO TID for Muscle Spasm, #60 TAB 0 Refills


   Prov: Moose Mcgrath MD         3/31/18


Disposition:  01 DISCHARGE HOME


Condition:  Stable











Moose Mcgrath MD Mar 31, 2018 01:14